# Patient Record
Sex: MALE | Race: WHITE
[De-identification: names, ages, dates, MRNs, and addresses within clinical notes are randomized per-mention and may not be internally consistent; named-entity substitution may affect disease eponyms.]

---

## 2017-05-29 ENCOUNTER — HOSPITAL ENCOUNTER (EMERGENCY)
Dept: HOSPITAL 74 - JERFT | Age: 32
Discharge: HOME | End: 2017-05-29
Payer: COMMERCIAL

## 2017-05-29 VITALS — SYSTOLIC BLOOD PRESSURE: 118 MMHG | TEMPERATURE: 98 F | HEART RATE: 57 BPM | DIASTOLIC BLOOD PRESSURE: 75 MMHG

## 2017-05-29 VITALS — BODY MASS INDEX: 29.7 KG/M2

## 2017-05-29 DIAGNOSIS — L23.7: Primary | ICD-10-CM

## 2017-05-29 NOTE — PDOC
History of Present Illness





- General


Chief Complaint: Rash


Stated Complaint: RASH


Time Seen by Provider: 05/29/17 16:25


History Source: Patient





- History of Present Illness


Timing/Duration: reports: other


Location: reports: hands





Past History





- Past Medical History


Allergies/Adverse Reactions: 


 Allergies











Allergy/AdvReac Type Severity Reaction Status Date / Time


 


No Known Allergies Allergy   Verified 05/29/17 16:15











Home Medications: 


Ambulatory Orders





Clobetasol Prop 0.05% Tp Oint [Temovate (Nf)] 60 gm NR BID #1 tube 05/29/17 


Loratadine [Claritin] 10 mg PO DAILY #14 tablet 05/29/17 








Other medical history: NONE





- Psycho/Social/Smoking Cessation Hx


Anxiety: No


Suicidal Ideation: No


Smoking History: Current some day smoker


Have you smoked in the past 12 months: Yes


Number of Cigarettes Smoked Daily: 1


Information on smoking cessation initiated: No


Hx Alcohol Use: No


Drug/Substance Use Hx: No


Substance Use Type: Alcohol





**Review of Systems





- Review of Systems


Constitutional: No: Chills, Fever


Integumentary: Yes: Pruritus, Rash





*Physical Exam





- Vital Signs


 Last Vital Signs











Temp Pulse Resp BP Pulse Ox


 


 98.0 F   57 L  18   118/75   100 


 


 05/29/17 16:15  05/29/17 16:15  05/29/17 16:15  05/29/17 16:15  05/29/17 16:15














- Physical Exam


General Appearance: Yes: Appropriately Dressed.  No: Apparent Distress


HEENT: positive: Normal Voice


Neck: positive: Supple


Respiratory/Chest: negative: Respiratory Distress


Integumentary: positive: Dry, Warm, Other (small group of vesicles on dorsum of 

R hand w/ several papules in a linear pattern extending proximally to ulnar 

aspect of wrist/forearm)


Neurologic: positive: Fully Oriented, Alert, Normal Mood/Affect





Medical Decision Making





- Medical Decision Making


05/29/17 17:40


31 yo M, no sig hx, here w/ pruritic rash to RUE x 3 days that he noticed at 

work, similar to last time he had an allergic rxn 2/2 poison ivy. Pt works as a 

 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





See exam





Possible localized poison ivy allergy, less likely shingles as not confined to 

specific dermatome and rash similar to prior poison ivy allergy


-dc w/ clobetasol and antihistamine


-work precautions discussed


-reasons to return d/w pt





05/29/17 17:45








*DC/Admit/Observation/Transfer


Diagnosis at time of Disposition: 


 Poison ivy dermatitis





- Discharge Dispostion


Disposition: HOME


Condition at time of disposition: Good





- Prescriptions


Prescriptions: 


Loratadine [Claritin] 10 mg PO DAILY #14 tablet


Clobetasol Prop 0.05% Tp Oint [Temovate (Nf)] 60 gm NR BID #1 tube





- Patient Instructions


Printed Discharge Instructions:  DI for Poison Ivy Allergy


Additional Instructions: 


Take medications as directed and return for persistent and/or worsening of 

symptoms


Print Language: Indonesian

## 2019-06-14 ENCOUNTER — HOSPITAL ENCOUNTER (EMERGENCY)
Dept: HOSPITAL 74 - JER | Age: 34
Discharge: HOME | End: 2019-06-14
Payer: SELF-PAY